# Patient Record
Sex: FEMALE | URBAN - METROPOLITAN AREA
[De-identification: names, ages, dates, MRNs, and addresses within clinical notes are randomized per-mention and may not be internally consistent; named-entity substitution may affect disease eponyms.]

---

## 2017-01-04 ENCOUNTER — APPOINTMENT (RX ONLY)
Dept: URBAN - METROPOLITAN AREA TELEMEDICINE 2 | Facility: TELEMEDICINE | Age: 42
Setting detail: DERMATOLOGY
End: 2017-01-04

## 2017-01-04 DIAGNOSIS — N62 HYPERTROPHY OF BREAST: ICD-10-CM

## 2017-01-04 PROCEDURE — ? PRESCRIPTION

## 2017-01-04 PROCEDURE — ? COUNSELING - MACROMASTIA

## 2017-01-04 PROCEDURE — ? RECOMMENDATIONS

## 2017-01-04 PROCEDURE — 99212 OFFICE O/P EST SF 10 MIN: CPT

## 2017-01-04 ASSESSMENT — LOCATION ZONE DERM: LOCATION ZONE: TRUNK

## 2017-01-04 ASSESSMENT — LOCATION DETAILED DESCRIPTION DERM
LOCATION DETAILED: RIGHT MEDIAL BREAST 2-3:00 REGION
LOCATION DETAILED: LEFT MEDIAL BREAST 10-11:00 REGION
LOCATION DETAILED: RIGHT MEDIAL BREAST 3-4:00 REGION
LOCATION DETAILED: LEFT MEDIAL BREAST 9-10:00 REGION

## 2017-01-04 ASSESSMENT — LOCATION SIMPLE DESCRIPTION DERM
LOCATION SIMPLE: LEFT BREAST
LOCATION SIMPLE: RIGHT BREAST

## 2017-01-04 NOTE — HPI: BREAST (MACROMASTIA)
Which Breast(S) Are You Concerned About?: bilateral breasts
Which Side(S)?: both breasts
Is This A New Presentation, Or A Follow-Up?: Macromastia
Date Of Abnormal Mammogram?: 08/10/16
Findings: Upon US, findings were normal.
Date Of Last Mammogram?: 08/10/2016
Additional History: BMI\\nHT \\nWT 188
Findings: BI-RADS cat 0, clear upon

## 2017-01-04 NOTE — PROCEDURE: RECOMMENDATIONS
Detail Level: Zone
Recommendation Preamble: The following recommendations were made during the visit:
Recommendations (Free Text): Although the patient obtained pre-authorization, significant time has passed and we will seek pre-auth again.

## 2017-01-09 ENCOUNTER — APPOINTMENT (RX ONLY)
Dept: URBAN - METROPOLITAN AREA CLINIC 5 | Facility: CLINIC | Age: 42
Setting detail: DERMATOLOGY
End: 2017-01-09

## 2017-01-09 DIAGNOSIS — N62 HYPERTROPHY OF BREAST: ICD-10-CM

## 2017-01-09 PROCEDURE — ? COUNSELING - MACROMASTIA

## 2017-01-09 PROCEDURE — ? RECOMMENDATIONS

## 2017-01-09 PROCEDURE — 99024 POSTOP FOLLOW-UP VISIT: CPT

## 2017-01-09 PROCEDURE — ? PRE-OP WORKLIST

## 2017-01-09 RX ORDER — PROMETHAZINE HYDROCHLORIDE 12.5 MG/1
TABLET ORAL
Qty: 15 | Refills: 0 | Status: ERX | COMMUNITY
Start: 2017-01-09

## 2017-01-09 RX ADMIN — PROMETHAZINE HYDROCHLORIDE: 12.5 TABLET ORAL at 22:11

## 2017-01-09 ASSESSMENT — LOCATION ZONE DERM: LOCATION ZONE: TRUNK

## 2017-01-09 ASSESSMENT — LOCATION SIMPLE DESCRIPTION DERM
LOCATION SIMPLE: LEFT BREAST
LOCATION SIMPLE: RIGHT BREAST

## 2017-01-09 ASSESSMENT — LOCATION DETAILED DESCRIPTION DERM
LOCATION DETAILED: LEFT MEDIAL BREAST 9-10:00 REGION
LOCATION DETAILED: RIGHT MEDIAL BREAST 3-4:00 REGION
LOCATION DETAILED: RIGHT MEDIAL BREAST 2-3:00 REGION
LOCATION DETAILED: LEFT MEDIAL BREAST 10-11:00 REGION

## 2017-01-09 NOTE — PROCEDURE: PRE-OP WORKLIST
Photos Taken?: no
Admission Status: 23 hour observation
Detail Level: Simple
Surgeon: Julio Arellano MD
Surgery Scheduled: breast reduction
Date Of Surgery: 01/12/17

## 2017-01-09 NOTE — HPI: PREOPERATIVE APPOINTMENT
Which Side(S)?: bilateral
Date Of Procedure?: 01/12/17
[place] [facility] [place] Adan
Additional History: BMI 31\\nHT 5'6\"\\nWT 188

## 2017-01-09 NOTE — PROCEDURE: RECOMMENDATIONS
Detail Level: Zone
Recommendations (Free Text): Although the patient obtained pre-authorization, significant time has passed and we will seek pre-auth again.
Recommendation Preamble: The following recommendations were made during the visit:

## 2017-01-13 ENCOUNTER — APPOINTMENT (RX ONLY)
Dept: URBAN - METROPOLITAN AREA CLINIC 5 | Facility: CLINIC | Age: 42
Setting detail: DERMATOLOGY
End: 2017-01-13

## 2017-01-13 DIAGNOSIS — Z48.89 ENCOUNTER FOR OTHER SPECIFIED SURGICAL AFTERCARE: ICD-10-CM

## 2017-01-13 PROCEDURE — ? PATIENT SPECIFIC COUNSELING

## 2017-01-13 PROCEDURE — ? DRAIN REMOVAL

## 2017-01-13 PROCEDURE — ? COUNSELING - POST-OP CHECK, BREAST REDUCTION

## 2017-01-13 ASSESSMENT — LOCATION DETAILED DESCRIPTION DERM
LOCATION DETAILED: LEFT MEDIAL BREAST 9-10:00 REGION
LOCATION DETAILED: RIGHT RIB CAGE
LOCATION DETAILED: LEFT RIB CAGE
LOCATION DETAILED: RIGHT MEDIAL BREAST 4-5:00 REGION

## 2017-01-13 ASSESSMENT — LOCATION SIMPLE DESCRIPTION DERM
LOCATION SIMPLE: ABDOMEN
LOCATION SIMPLE: RIGHT BREAST
LOCATION SIMPLE: LEFT BREAST

## 2017-01-13 ASSESSMENT — LOCATION ZONE DERM
LOCATION ZONE: TRUNK
LOCATION ZONE: TRUNK

## 2017-01-20 ENCOUNTER — APPOINTMENT (RX ONLY)
Dept: URBAN - METROPOLITAN AREA TELEMEDICINE 2 | Facility: TELEMEDICINE | Age: 42
Setting detail: DERMATOLOGY
End: 2017-01-20

## 2017-01-20 DIAGNOSIS — Z48.89 ENCOUNTER FOR OTHER SPECIFIED SURGICAL AFTERCARE: ICD-10-CM

## 2017-01-20 PROCEDURE — ? COUNSELING - POST-OP CHECK, BREAST REDUCTION

## 2017-01-20 PROCEDURE — 99024 POSTOP FOLLOW-UP VISIT: CPT

## 2017-01-20 PROCEDURE — ? PATIENT SPECIFIC COUNSELING

## 2017-01-20 ASSESSMENT — LOCATION SIMPLE DESCRIPTION DERM
LOCATION SIMPLE: RIGHT BREAST
LOCATION SIMPLE: LEFT BREAST

## 2017-01-20 ASSESSMENT — LOCATION DETAILED DESCRIPTION DERM
LOCATION DETAILED: RIGHT MEDIAL BREAST 3-4:00 REGION
LOCATION DETAILED: LEFT MEDIAL BREAST 9-10:00 REGION

## 2017-01-20 ASSESSMENT — LOCATION ZONE DERM: LOCATION ZONE: TRUNK

## 2017-01-20 NOTE — PROCEDURE: PATIENT SPECIFIC COUNSELING
Detail Level: Zone
Other (Free Text): - advised patient to apply neospoirn to incision site 1-2 times daily\\n- light activity\\n- will start scar cream at next visit

## 2017-01-31 ENCOUNTER — APPOINTMENT (RX ONLY)
Dept: URBAN - NONMETROPOLITAN AREA CLINIC 22 | Facility: CLINIC | Age: 42
Setting detail: DERMATOLOGY
End: 2017-01-31

## 2017-01-31 ENCOUNTER — RX ONLY (OUTPATIENT)
Age: 42
Setting detail: RX ONLY
End: 2017-01-31

## 2017-01-31 DIAGNOSIS — L73.2 HIDRADENITIS SUPPURATIVA: ICD-10-CM | Status: IMPROVED

## 2017-01-31 PROCEDURE — 99213 OFFICE O/P EST LOW 20 MIN: CPT

## 2017-01-31 PROCEDURE — ? FOLLOW UP FOR NEXT VISIT

## 2017-01-31 PROCEDURE — ? TREATMENT REGIMEN

## 2017-01-31 PROCEDURE — ? PRESCRIPTION

## 2017-01-31 PROCEDURE — ? COUNSELING

## 2017-01-31 RX ORDER — CLINDAMYCIN PHOSPHATE 1 %
1% SWAB, MEDICATED TOPICAL DAILY
Qty: 1 | Refills: 2 | Status: ERX

## 2017-01-31 RX ORDER — BENZOYL PEROXIDE 100 MG/G
10% LOTION TOPICAL DAILY
Qty: 1 | Refills: 2 | Status: ERX

## 2017-01-31 RX ORDER — DOXYCYCLINE HYCLATE 100 MG/1
100MG CAPSULE, GELATIN COATED ORAL BID
Qty: 14 | Refills: 2 | Status: ERX

## 2017-01-31 ASSESSMENT — LOCATION SIMPLE DESCRIPTION DERM
LOCATION SIMPLE: LEFT INGUINAL FOLD
LOCATION SIMPLE: RIGHT INGUINAL FOLD
LOCATION SIMPLE: VULVA

## 2017-01-31 ASSESSMENT — LOCATION DETAILED DESCRIPTION DERM
LOCATION DETAILED: RIGHT LABIA MAJORA
LOCATION DETAILED: LEFT INGUINAL FOLD
LOCATION DETAILED: LEFT LABIA MAJORA
LOCATION DETAILED: RIGHT INGUINAL FOLD
LOCATION DETAILED: MONS

## 2017-01-31 ASSESSMENT — LOCATION ZONE DERM
LOCATION ZONE: LEG
LOCATION ZONE: VULVA

## 2017-01-31 NOTE — PROCEDURE: TREATMENT REGIMEN
Initiate Treatment: Doxycycline Hyclate 100mg - 1 PO BID X 7 days for flair ups
Plan: Informed patient wear clothing that reduces friction and skin on skin rubbing. Instructed for her to continue to refrain from shaving the affected area and to continue with trimming hair. Discussed starting Humira if condition continues to worsen. Recommended for patient to continue treatment as directed today. Doxycycline will be given with refills for any flair ups that she may have
Detail Level: Zone
Continue Regimen: Clindamycin topical swabs 1% - Apply to affected areas on the groin daily \\nBPO wash 10% - Use as a wash to affected areas on the groin daily

## 2017-02-03 ENCOUNTER — APPOINTMENT (RX ONLY)
Dept: URBAN - METROPOLITAN AREA CLINIC 5 | Facility: CLINIC | Age: 42
Setting detail: DERMATOLOGY
End: 2017-02-03

## 2017-02-03 DIAGNOSIS — Z48.89 ENCOUNTER FOR OTHER SPECIFIED SURGICAL AFTERCARE: ICD-10-CM

## 2017-02-03 PROCEDURE — ? SUTURE REMOVAL

## 2017-02-03 PROCEDURE — 99024 POSTOP FOLLOW-UP VISIT: CPT

## 2017-02-03 ASSESSMENT — LOCATION DETAILED DESCRIPTION DERM
LOCATION DETAILED: LEFT MEDIAL BREAST 6-7:00 REGION
LOCATION DETAILED: RIGHT LATERAL BREAST 6-7:00 REGION

## 2017-02-03 ASSESSMENT — LOCATION SIMPLE DESCRIPTION DERM
LOCATION SIMPLE: RIGHT BREAST
LOCATION SIMPLE: LEFT BREAST

## 2017-02-03 ASSESSMENT — LOCATION ZONE DERM: LOCATION ZONE: TRUNK

## 2017-02-22 ENCOUNTER — APPOINTMENT (RX ONLY)
Dept: URBAN - METROPOLITAN AREA CLINIC 5 | Facility: CLINIC | Age: 42
Setting detail: DERMATOLOGY
End: 2017-02-22

## 2017-02-22 DIAGNOSIS — Z48.02 ENCOUNTER FOR REMOVAL OF SUTURES: ICD-10-CM

## 2017-02-22 PROCEDURE — ? RECOMMENDATIONS

## 2017-02-22 PROCEDURE — 99024 POSTOP FOLLOW-UP VISIT: CPT

## 2017-02-22 PROCEDURE — ? SUTURE REMOVAL

## 2017-02-22 ASSESSMENT — LOCATION DETAILED DESCRIPTION DERM
LOCATION DETAILED: RIGHT LATERAL BREAST 6-7:00 REGION
LOCATION DETAILED: RIGHT LATERAL BREAST 6-7:00 REGION

## 2017-02-22 ASSESSMENT — LOCATION SIMPLE DESCRIPTION DERM
LOCATION SIMPLE: RIGHT BREAST
LOCATION SIMPLE: RIGHT BREAST

## 2017-02-22 ASSESSMENT — LOCATION ZONE DERM
LOCATION ZONE: TRUNK
LOCATION ZONE: TRUNK

## 2017-02-22 NOTE — PROCEDURE: RECOMMENDATIONS
Recommendations (Free Text): -patient is evenly tanned. sun protection discussed.
Recommendation Preamble: The following recommendations were made during the visit:
Detail Level: Zone

## 2017-03-03 ENCOUNTER — APPOINTMENT (RX ONLY)
Dept: URBAN - METROPOLITAN AREA CLINIC 5 | Facility: CLINIC | Age: 42
Setting detail: DERMATOLOGY
End: 2017-03-03

## 2017-03-03 DIAGNOSIS — Z48.89 ENCOUNTER FOR OTHER SPECIFIED SURGICAL AFTERCARE: ICD-10-CM

## 2017-03-03 PROCEDURE — ? PATIENT SPECIFIC COUNSELING

## 2017-03-03 ASSESSMENT — LOCATION SIMPLE DESCRIPTION DERM
LOCATION SIMPLE: LEFT BREAST
LOCATION SIMPLE: RIGHT BREAST

## 2017-03-03 ASSESSMENT — LOCATION DETAILED DESCRIPTION DERM
LOCATION DETAILED: RIGHT MEDIAL BREAST 4-5:00 REGION
LOCATION DETAILED: LEFT MEDIAL BREAST 8-9:00 REGION

## 2017-03-03 ASSESSMENT — LOCATION ZONE DERM: LOCATION ZONE: TRUNK

## 2017-03-03 NOTE — HPI: POST-OP CHECK, BREAST REDUCTION
History: Patient reports site of trimmed suture had some discharge the morning after the last appointment. She also c/o burning sensation of the right nipple

## 2017-03-03 NOTE — PROCEDURE: PATIENT SPECIFIC COUNSELING
Detail Level: Zone
Other (Free Text): -reassured patient burning sensation is normal \\n-site of previously trimmed suture, well-healed

## 2017-03-07 ENCOUNTER — APPOINTMENT (RX ONLY)
Dept: URBAN - METROPOLITAN AREA CLINIC 29 | Facility: CLINIC | Age: 42
Setting detail: DERMATOLOGY
End: 2017-03-07

## 2017-03-07 DIAGNOSIS — Z41.1 ENCOUNTER FOR COSMETIC SURGERY: ICD-10-CM

## 2017-03-07 PROCEDURE — ? RECOMMENDATIONS

## 2017-03-07 PROCEDURE — ? CONSULTATION - ABDOMINOPLASTY

## 2017-03-07 ASSESSMENT — LOCATION SIMPLE DESCRIPTION DERM: LOCATION SIMPLE: ABDOMEN

## 2017-03-07 ASSESSMENT — LOCATION ZONE DERM: LOCATION ZONE: TRUNK

## 2017-03-07 ASSESSMENT — LOCATION DETAILED DESCRIPTION DERM: LOCATION DETAILED: PERIUMBILICAL SKIN

## 2017-03-07 NOTE — HPI: ABDOMEN (AESTHETIC DEFORMITY, ABDOMEN)
How Severe Is It?: moderate
Is This A New Presentation, Or A Follow-Up?: Abdominal Aesthetic Deformity

## 2017-03-07 NOTE — PROCEDURE: RECOMMENDATIONS
Recommendations (Free Text): -Need progress notes from previous doctor to determine what was recommended. \\n-Discussed with patient that tummy tuck along with lipo is not topically covered by insurance
Recommendation Preamble: The following recommendations were made during the visit:
Detail Level: Zone

## 2017-03-24 ENCOUNTER — APPOINTMENT (RX ONLY)
Dept: URBAN - METROPOLITAN AREA CLINIC 5 | Facility: CLINIC | Age: 42
Setting detail: DERMATOLOGY
End: 2017-03-24

## 2017-03-24 DIAGNOSIS — Z48.89 ENCOUNTER FOR OTHER SPECIFIED SURGICAL AFTERCARE: ICD-10-CM

## 2017-03-24 PROCEDURE — ? PATIENT SPECIFIC COUNSELING

## 2017-03-24 NOTE — PROCEDURE: PATIENT SPECIFIC COUNSELING
Other (Free Text): -f/u in two weeks for possible scar revision \\n-apply neosporin twice daily to raw spot\\n-no pools, hot tubs, etc
Detail Level: Zone

## 2017-04-07 ENCOUNTER — APPOINTMENT (RX ONLY)
Dept: URBAN - METROPOLITAN AREA CLINIC 5 | Facility: CLINIC | Age: 42
Setting detail: DERMATOLOGY
End: 2017-04-07

## 2017-04-07 DIAGNOSIS — Z48.89 ENCOUNTER FOR OTHER SPECIFIED SURGICAL AFTERCARE: ICD-10-CM

## 2017-04-07 PROCEDURE — ? RECOMMENDATIONS

## 2017-04-07 NOTE — PROCEDURE: RECOMMENDATIONS
Recommendation Preamble: The following recommendations were made during the visit:
Recommendations (Free Text): Sun protection discussed
Detail Level: Zone

## 2018-06-21 ENCOUNTER — OFFICE VISIT (OUTPATIENT)
Dept: URBAN - METROPOLITAN AREA CLINIC 58 | Facility: CLINIC | Age: 43
End: 2018-06-21
Payer: OTHER GOVERNMENT

## 2018-06-21 DIAGNOSIS — H52.4 PRESBYOPIA: Primary | ICD-10-CM

## 2018-06-21 DIAGNOSIS — G43.109 MIGRAINE W/ AURA: ICD-10-CM

## 2018-06-21 DIAGNOSIS — H26.033: ICD-10-CM

## 2018-06-21 PROCEDURE — 92004 COMPRE OPH EXAM NEW PT 1/>: CPT | Performed by: OPTOMETRIST

## 2018-06-21 PROCEDURE — 92015 DETERMINE REFRACTIVE STATE: CPT | Performed by: OPTOMETRIST

## 2018-06-21 ASSESSMENT — INTRAOCULAR PRESSURE
OS: 12
OD: 14

## 2018-06-21 ASSESSMENT — VISUAL ACUITY
OS: 20/20
OD: 20/20

## 2018-06-21 NOTE — IMPRESSION/PLAN
Impression: Presbyopia: H52.4. Plan: Diagnosis discussed. SRx released, pt edu that a temporary adjustment is expected. Continue to monitor.

## 2018-06-21 NOTE — IMPRESSION/PLAN
Impression: Migraine w/ aura: G43.109. Plan: Discussed diagnosis in detail with patient. No treatment is required at this time. Will continue to observe condition and or symptoms. Call if [address] worsens.

## 2018-06-21 NOTE — IMPRESSION/PLAN
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 Plan: Discussed diagnosis in detail with patient. No treatment is required at this time. Will continue to observe condition and or symptoms. Call if [address] worsens.